# Patient Record
Sex: FEMALE | URBAN - METROPOLITAN AREA
[De-identification: names, ages, dates, MRNs, and addresses within clinical notes are randomized per-mention and may not be internally consistent; named-entity substitution may affect disease eponyms.]

---

## 2022-11-14 ENCOUNTER — OFFICE (OUTPATIENT)
Dept: URBAN - METROPOLITAN AREA CLINIC 19 | Facility: CLINIC | Age: 27
End: 2022-11-14

## 2022-11-14 VITALS
DIASTOLIC BLOOD PRESSURE: 75 MMHG | WEIGHT: 158 LBS | HEART RATE: 72 BPM | HEIGHT: 66 IN | OXYGEN SATURATION: 99 % | SYSTOLIC BLOOD PRESSURE: 137 MMHG

## 2022-11-14 DIAGNOSIS — K56.41 FECAL IMPACTION: ICD-10-CM

## 2022-11-14 DIAGNOSIS — R19.5 OTHER FECAL ABNORMALITIES: ICD-10-CM

## 2022-11-14 DIAGNOSIS — K59.00 CONSTIPATION, UNSPECIFIED: ICD-10-CM

## 2022-11-14 DIAGNOSIS — K92.1 MELENA: ICD-10-CM

## 2022-11-14 PROCEDURE — 99204 OFFICE O/P NEW MOD 45 MIN: CPT | Performed by: INTERNAL MEDICINE

## 2022-11-14 NOTE — SERVICEHPINOTES
Ms. Morales is a 28 y/o  female with PMH including rhabdomyolysis in October of 2020 secondary to exercise, asthma, seasonal allergies. 
mando gilmore She presents to clinic today as a referral from her OBGYN Dr. Rosales for chronic constipation. Onset of constipation since infancy, considered normal to BM once per week. This has occurred her "entire life." She states over the last 3 months, has been worse than her baseline. She has rare bloating. No AP, melena. Noticed medium volume bright blood on tissue paper and in toilet bowl last week- first occurrence. No proctalgia. She drinks lots of water, tried fiber one bars which makes her have more flatulence. 
br
Lachomanuel is not taking Miralax daily, only prn when she gets miserable. No laxatives. She states she tried Dulocalx once (with water) and had horrific cramps and never tried this again. No samples of Linzess were given by Dr. Rosales, but too expensive. No unexplained weight loss, rashes, dysphagia, odynophagia, GERD. No NSAIDs. No h/o IBD in her family. She has never had a colonoscopy before. 
mando gilmore She had lab work with Dr. Rosales in August 2022, normal CBC, CMP, TSH, lipid panel.
mando gilmore

## 2022-11-14 NOTE — SERVICENOTES
Recently had a normal TSH, CBC, CMP, lipid panel, 

MD Addendum: The patient was seen along with Mary Kate Menchaca NP. I have evaluated the patient,  and formulated the above plan alongside her. -ADIEL